# Patient Record
Sex: FEMALE | Race: WHITE | Employment: OTHER | URBAN - METROPOLITAN AREA
[De-identification: names, ages, dates, MRNs, and addresses within clinical notes are randomized per-mention and may not be internally consistent; named-entity substitution may affect disease eponyms.]

---

## 2017-01-23 ENCOUNTER — HOSPITAL ENCOUNTER (OUTPATIENT)
Dept: ULTRASOUND IMAGING | Age: 82
Discharge: HOME | End: 2017-01-23
Attending: INTERNAL MEDICINE | Admitting: INTERNAL MEDICINE

## 2017-01-23 DIAGNOSIS — N63.0 LUMP OR MASS IN BREAST: ICD-10-CM

## 2017-01-23 DIAGNOSIS — N63.0 BREAST LUMP: ICD-10-CM

## 2017-02-03 RX ORDER — LORAZEPAM 0.5 MG/1
TABLET ORAL
Qty: 60 TABLET | Refills: 0 | Status: SHIPPED | OUTPATIENT
Start: 2017-02-03

## 2017-02-09 ENCOUNTER — HOSPITAL ENCOUNTER (OUTPATIENT)
Dept: ULTRASOUND IMAGING | Age: 82
Discharge: HOME | End: 2017-02-09
Attending: INTERNAL MEDICINE | Admitting: INTERNAL MEDICINE

## 2017-02-09 DIAGNOSIS — Z13.9 SCREENING: ICD-10-CM

## 2017-02-09 DIAGNOSIS — N63.0 BREAST LUMP: ICD-10-CM

## 2017-10-02 ENCOUNTER — OFFICE VISIT (OUTPATIENT)
Dept: ENT CLINIC | Age: 82
End: 2017-10-02

## 2017-10-02 VITALS
BODY MASS INDEX: 23.79 KG/M2 | HEART RATE: 75 BPM | SYSTOLIC BLOOD PRESSURE: 127 MMHG | HEIGHT: 61 IN | DIASTOLIC BLOOD PRESSURE: 72 MMHG | WEIGHT: 126 LBS

## 2017-10-02 DIAGNOSIS — H66.91 OTITIS MEDIA, CHRONIC, WITH ANTERIOR PERFORATION, RIGHT: ICD-10-CM

## 2017-10-02 DIAGNOSIS — R04.0 EPISTAXIS, RECURRENT: Primary | ICD-10-CM

## 2017-10-02 DIAGNOSIS — H72.91 OTITIS MEDIA, CHRONIC, WITH ANTERIOR PERFORATION, RIGHT: ICD-10-CM

## 2017-10-02 PROCEDURE — G8427 DOCREV CUR MEDS BY ELIG CLIN: HCPCS | Performed by: OTOLARYNGOLOGY

## 2017-10-02 PROCEDURE — 1123F ACP DISCUSS/DSCN MKR DOCD: CPT | Performed by: OTOLARYNGOLOGY

## 2017-10-02 PROCEDURE — 30901 CONTROL OF NOSEBLEED: CPT | Performed by: OTOLARYNGOLOGY

## 2017-10-02 PROCEDURE — G8420 CALC BMI NORM PARAMETERS: HCPCS | Performed by: OTOLARYNGOLOGY

## 2017-10-02 PROCEDURE — 4040F PNEUMOC VAC/ADMIN/RCVD: CPT | Performed by: OTOLARYNGOLOGY

## 2017-10-02 PROCEDURE — 1036F TOBACCO NON-USER: CPT | Performed by: OTOLARYNGOLOGY

## 2017-10-02 PROCEDURE — 99213 OFFICE O/P EST LOW 20 MIN: CPT | Performed by: OTOLARYNGOLOGY

## 2017-10-02 PROCEDURE — 1090F PRES/ABSN URINE INCON ASSESS: CPT | Performed by: OTOLARYNGOLOGY

## 2017-10-02 PROCEDURE — G8484 FLU IMMUNIZE NO ADMIN: HCPCS | Performed by: OTOLARYNGOLOGY

## 2017-10-02 RX ORDER — NEOMYCIN SULFATE, POLYMYXIN B SULFATE AND HYDROCORTISONE 10; 3.5; 1 MG/ML; MG/ML; [USP'U]/ML
SUSPENSION/ DROPS AURICULAR (OTIC)
Qty: 10 ML | Refills: 1 | Status: SHIPPED | OUTPATIENT
Start: 2017-10-02

## 2017-10-02 NOTE — MR AVS SNAPSHOT
After Visit Summary             Prisca Ibrahim   10/2/2017 10:30 AM   Office Visit    Description:  Female : 1929   Provider:  Nydia Rachel MD   Department:  [address] [facility]              Your Follow-Up and Future Appointments         Below is a list of your follow-up and future appointments. This may not be a complete list as you may have made appointments directly with providers that we are not aware of or your providers may have made some for you. Please call your providers to confirm appointments. It is important to keep your appointments. Please bring your current insurance card, photo ID, co-pay, and all medication bottles to your appointment. If self-pay, payment is expected at the time of service. Information from Your Visit        Department     Name Address Phone Fax    [address] [facility] [address] [place] [address], [address] [place] [address] [phone]      You Were Seen for:         Comments    Epistaxis, recurrent   [[id number]]         Vital Signs     Blood Pressure Pulse Height Weight Breastfeeding? Body Mass Index    127/72 (Site: Right Arm, Position: Sitting) 75 5' 1\" (1.549 m) 126 lb (57.2 kg) No 23.81 kg/m2    Smoking Status                   Never Smoker              Today's Medication Changes          These changes are accurate as of: 10/2/17 11:00 AM.  If you have any questions, ask your nurse or doctor. START taking these medications           mupirocin 2 % ointment   Commonly known as:  BACTROBAN   Instructions:  Apply topically  Daily. Apply with Q tip in each nostril every evening,   Quantity:  15 g   Refills:  1   Started by:  Nydia Rachel MD         CHANGE how you take these medications           * LORazepam 0.5 MG tablet   Commonly known as:  ATIVAN   Instructions:  1/2 tablet QD and 1/2 tablet Q 6hrs PRN anxiety   Quantity:  60 tablet   Refills:  0   What changed:    - how much to take  - how to take this - when to take this  - reasons to take this  - additional instructions   Changed by: Galina Thorpe MD       * LORazepam 0.5 MG tablet   Commonly known as:  ATIVAN   Instructions: Take 0.25 mg by mouth daily   Refills:  0   What changed:  Another medication with the same name was changed. Make sure you understand how and when to take each. * neomycin-polymyxin-hydrocortisone 3.5-93548-6 otic solution   Commonly known as:  CORTISPORIN   Instructions:  Place 5 drops into both ears 2 times daily for 10 days   Quantity:  1 Bottle   Refills:  1   What changed:  Another medication with the same name was added. Make sure you understand how and when to take each. Changed by:  Donna Arias MD       * neomycin-polymyxin-hydrocortisone 3.5-69214-2 otic solution   Commonly known as:  CORTISPORIN   Instructions:  Place 4 drops into the left ear 2 times daily   Quantity:  2 each   Refills:  0   What changed:  Another medication with the same name was added. Make sure you understand how and when to take each. Changed by:  Donna Arias MD       * neomycin-polymyxin-hydrocortisone 3.5-48049-5 otic suspension   Commonly known as:  CORTISPORIN   Instructions:  Apply in right ear every night   Quantity:  10 mL   Refills:  1   What changed: You were already taking a medication with the same name, and this prescription was added. Make sure you understand how and when to take each. Changed by:  Donna Arias MD       * Notice: This list has 5 medication(s) that are the same as other medications prescribed for you. Read the directions carefully, and ask your doctor or other care provider to review them with you.          Where to Get Your Medications      You can get these medications from any pharmacy     Bring a paper prescription for each of these medications     mupirocin 2 % ointment    neomycin-polymyxin-hydrocortisone 3.5-15699-3 otic suspension               Your Current Medications Are neomycin-polymyxin-hydrocortisone (CORTISPORIN) 3.5-35260-8 otic suspension Apply in right ear every night    mupirocin (BACTROBAN) 2 % ointment Apply topically  Daily. Apply with Q tip in each nostril every evening,    artificial tears (ARTIFICIAL TEARS) OINT See Admin Instructions 2 drops to both eyes 4 times a day for dry eyes    LORazepam (ATIVAN) 0.5 MG tablet Take 0.25 mg by mouth daily    calcium carbonate 600 MG TABS tablet Take 1 tablet by mouth daily    hydrocortisone 1 % cream Apply topically 2 times daily Apply topically 2 times daily. aluminum & magnesium hydroxide-simethicone (MAALOX) 200-200-20 MG/5ML SUSP suspension Take 5 mLs by mouth every 6 hours as needed for Indigestion    naphazoline-pheniramine (NAPHCON-A) 0.025-0.3 % ophthalmic solution Place 2 drops into both eyes every 6 hours as needed    oxyCODONE-acetaminophen (PERCOCET) 5-325 MG per tablet Take 1 tablet by mouth every 4 hours as needed for Pain .    ezetimibe (ZETIA) 10 MG tablet Take 10 mg by mouth daily    LORazepam (ATIVAN) 0.5 MG tablet 1/2 tablet QD and 1/2 tablet Q 6hrs PRN anxiety    sertraline (ZOLOFT) 100 MG tablet Take 1 tablet by mouth daily    bisacodyl (DULCOLAX) 10 MG suppository Place 10 mg rectally daily as needed for Constipation    Melatonin 5 MG TABS tablet Take by mouth daily    Calcitonin, [place], (MIACALCIN NA) by Nasal route daily Alternate left and right nostrils    nitroGLYCERIN (NITROSTAT) 0.4 MG SL tablet Place 0.4 mg under the tongue every 5 minutes as needed for Chest pain    sennosides-docusate sodium (SENOKOT-S) 8.6-50 MG tablet Take 1 tablet by mouth 2 times daily    simethicone (MYLICON) 80 MG chewable tablet Take 80 mg by mouth 2 times daily    TraZODone & Diet Manage Prod (TRAZAMINE) 50 MG MISC Take by mouth nightly    lidocaine (LIDODERM) 5 % Place 1 patch onto the skin daily 12 hours on, 12 hours off.     Magnesium Hydroxide (MILK OF MAGNESIA PO) Take by mouth loratadine (CLARITIN) 10 MG tablet Take 10 mg by mouth daily. diltiazem (CARDIZEM CD) 120 MG ER capsule Take 120 mg by mouth daily. cyclobenzaprine (FLEXERIL) 5 MG tablet Take 5 mg by mouth 3 times daily. omeprazole (PRILOSEC) 20 MG capsule Take 20 mg by mouth daily. guaiFENesin (MUCINEX) 600 MG SR tablet Take 1,200 mg by mouth 2 times daily. vitamin D (ERGOCALCIFEROL) 04617 UNITS CAPS capsule Take 50,000 Units by mouth every 30 days. loperamide (IMODIUM) 2 MG capsule Take 1 capsule by mouth 3 times daily as needed for Diarrhea. divalproex (DEPAKOTE) 125 MG DR tablet Take 250 mg by mouth nightly     carbidopa-levodopa (SINEMET)  MG per tablet Take 1 tablet by mouth 2 times daily. Take at noon and 2000     ( takes 1.5 tablet at 0800 and 1600)    aluminum & magnesium hydroxide-simethicone (MAALOX REGULAR STRENGTH) 200-200-20 MG/5ML SUSP suspension Take 30 mLs by mouth every 2 hours as needed for Indigestion. clotrimazole (LOTRIMIN) 1 % cream Apply 1 applicator topically 2 times daily. Apply topically 2 times daily to breasts    nystatin (MYCOSTATIN) cream Apply  topically 2 times daily. Apply topically 2 times daily. aspirin 81 MG EC tablet Take 81 mg by mouth daily. polycarbophil (FIBERCON) 625 MG tablet Take 1 tablet by mouth 2 times daily. acetaminophen (TYLENOL) 325 MG tablet Take 650 mg by mouth every 6 hours as needed. neomycin-polymyxin-hydrocortisone (CORTISPORIN) 3.5-62883-8 otic solution Place 4 drops into the left ear 2 times daily    neomycin-polymyxin-hydrocortisone (CORTISPORIN) 3.5-30180-8 otic solution Place 5 drops into both ears 2 times daily for 10 days    ipratropium-albuterol (DUONEB) 0.5-2.5 (3) MG/3ML SOLN nebulizer solution Inhale 1 vial into the lungs 4 times daily.       Allergies              Augmentin [Amoxicillin-pot Clavulanate]     Diflucan [Fluconazole]     Statins       We Ordered/Performed the following RI CTRL NOSEBLEED,ANTER,SIMPLE     Comments:    Nose suctioned of excess blood and clots. Treated with cotton pledgets impregnated with Afrin and Lidocaine for 2 minutes. Mucosal surfaces where bleeding occurs treated with silver nitrate cautery. Instructions given. Additional Information        Basic Information     Date Of Birth Sex Race Ethnicity Preferred Language    4/8/1929 Female White Non-/Non  English      Problem List as of 10/2/2017  Date Reviewed: 4/28/2014                Epistaxis, recurrent    Foot fracture    Cellulitis of left foot    LEFT great toe fracture    Otorrhea of left ear    Otorrhea of both ears    Otitis media, chronic, with anterior perforation    Back pain    Falling    Paranoia (HCC)    Allergic rhinitis    Dysphagia causing pulmonary aspiration with swallowing    Parkinson disease (HCC)    IBS (irritable bowel syndrome)    Gait instability (Chronic)    Anxiety    Depression    Hypercholesterolemia    Hypertension    Hypothyroidism      Preventive Care        Date Due    Tetanus Combination Vaccine (1 - Tdap) 4/8/1948    Zoster Vaccine 4/8/1989    Pneumococcal Vaccines (two) for all adults aged 72 and over (1 of 2 - PCV13) 4/8/1994    Yearly Flu Vaccine (1) 9/1/2017            [employer] Signup           [employer] allows you to send messages to your doctor, view your test results, renew your prescriptions, schedule appointments, view visit notes, and more. How Do I Sign Up? 1. In your Internet browser, go to [email or web address]  2. Click on the Sign Up Now link in the Sign In box. You will see the New Member Sign Up page. 3. Enter your [employer] Access Code exactly as it appears below. You will not need to use this code after youve completed the sign-up process. If you do not sign up before the expiration date, you must request a new code.   [employer] Access Code: [email or web address]  Expires: 10/14/2017  3:18 PM 4. Enter your Social Security Number ([id number]) and Date of Birth (mm/dd/yyyy) as indicated and click Submit. You will be taken to the next sign-up page. 5. Create a [employer] ID. This will be your [employer] login ID and cannot be changed, so think of one that is secure and easy to remember. 6. Create a Spark password. You can change your password at any time. 7. Enter your Password Reset Question and Answer. This can be used at a later time if you forget your password. 8. Enter your e-mail address. You will receive e-mail notification when new information is available in [address] 9. Click Sign Up. You can now view your medical record. Additional Information  If you have questions, please contact the physician practice where you receive care. Remember, [employer] is NOT to be used for urgent needs. For medical emergencies, dial 911. For questions regarding your [employer] account call [phone]. If you have a clinical question, please call your doctor's office.